# Patient Record
Sex: MALE | Race: BLACK OR AFRICAN AMERICAN | NOT HISPANIC OR LATINO | Employment: FULL TIME | URBAN - METROPOLITAN AREA
[De-identification: names, ages, dates, MRNs, and addresses within clinical notes are randomized per-mention and may not be internally consistent; named-entity substitution may affect disease eponyms.]

---

## 2024-04-23 ENCOUNTER — APPOINTMENT (OUTPATIENT)
Dept: GENERAL RADIOLOGY | Facility: HOSPITAL | Age: 34
End: 2024-04-23
Payer: OTHER MISCELLANEOUS

## 2024-04-23 ENCOUNTER — HOSPITAL ENCOUNTER (EMERGENCY)
Facility: HOSPITAL | Age: 34
Discharge: OTHER INSTITUTION | End: 2024-04-23
Attending: EMERGENCY MEDICINE
Payer: OTHER MISCELLANEOUS

## 2024-04-23 VITALS
OXYGEN SATURATION: 100 % | HEIGHT: 72 IN | SYSTOLIC BLOOD PRESSURE: 120 MMHG | HEART RATE: 69 BPM | RESPIRATION RATE: 18 BRPM | TEMPERATURE: 98.3 F | DIASTOLIC BLOOD PRESSURE: 81 MMHG

## 2024-04-23 DIAGNOSIS — S62.630B OPEN DISPLACED FRACTURE OF DISTAL PHALANX OF RIGHT INDEX FINGER, INITIAL ENCOUNTER: Primary | ICD-10-CM

## 2024-04-23 PROCEDURE — 99285 EMERGENCY DEPT VISIT HI MDM: CPT

## 2024-04-23 PROCEDURE — 73140 X-RAY EXAM OF FINGER(S): CPT

## 2024-04-23 NOTE — DISCHARGE INSTRUCTIONS
Do not eat or drink anything.  Go directly to [facility] emergency department to see hand surgery.  Keep your wound clean and covered until then.

## 2024-04-23 NOTE — ED PROVIDER NOTES
"Time: 8:27 AM EDT  Date of encounter:  4/23/2024  Independent Historian/Clinical History and Information was obtained by:   Patient    History is limited by: N/A    Chief Complaint: Right hand, index finger laceration      History of Present Illness:  Patient is a 34 y.o. year old male who presents to the emergency department for evaluation of laceration to the distal index finger of the right hand.  Patient states he was at work less than an hour ago when he cut his finger on a metal drain grate.  Patient is right-handed.  Patient reports tetanus vaccine within the last 2 years.  Patient denies decreased range of motion, fever, chills, and shortness of breath.    HPI    Patient Care Team  Primary Care Provider: Provider, No Known    Past Medical History:     No Known Allergies  No past medical history on file.  No past surgical history on file.  No family history on file.    Home Medications:  Prior to Admission medications    Not on File        Social History:          Review of Systems:  Review of Systems   Constitutional:  Negative for chills and fever.   HENT:  Negative for congestion and rhinorrhea.    Eyes:  Negative for visual disturbance.   Respiratory:  Negative for cough and shortness of breath.    Cardiovascular:  Negative for chest pain and palpitations.   Gastrointestinal:  Negative for abdominal pain.   Genitourinary:  Negative for dysuria.   Musculoskeletal:  Negative for neck stiffness.        Pain and laceration to the distal second digit of the right hand.   Skin:  Positive for wound.   Neurological:  Negative for syncope.   Psychiatric/Behavioral:  Negative for behavioral problems and confusion.         Physical Exam:  /97   Pulse 78   Temp 98.3 °F (36.8 °C) (Oral)   Resp 18   Ht 182.9 cm (72\")   SpO2 98%     Physical Exam  Constitutional:       Appearance: Normal appearance.   HENT:      Head: Normocephalic and atraumatic.      Nose: Nose normal.      Mouth/Throat:      Mouth: Mucous " membranes are moist.   Eyes:      Extraocular Movements: Extraocular movements intact.      Conjunctiva/sclera: Conjunctivae normal.   Cardiovascular:      Rate and Rhythm: Normal rate and regular rhythm.      Heart sounds: Normal heart sounds.   Pulmonary:      Effort: Pulmonary effort is normal.      Breath sounds: Normal breath sounds.   Musculoskeletal:         General: Normal range of motion.   Skin:     General: Skin is warm.      Coloration: Skin is not jaundiced.      Findings: Bruising and lesion present.      Comments: Laceration to the distal second digit of the right hand, with underlying ecchymosis.  ROM intact.   Neurological:      General: No focal deficit present.      Mental Status: He is alert and oriented to person, place, and time.   Psychiatric:         Mood and Affect: Mood normal.         Behavior: Behavior normal.                  Procedures:  Procedures      Medical Decision Making:      Comorbidities that affect care:    Smoking    External Notes reviewed:    None      The following orders were placed and all results were independently analyzed by me:  Orders Placed This Encounter   Procedures    XR Finger 2+ View Right    IP General Consult (Use specialty-specific consult if known)       Medications Given in the Emergency Department:  Medications - No data to display     ED Course:         Labs:    Lab Results (last 24 hours)       ** No results found for the last 24 hours. **             Imaging:    XR Finger 2+ View Right    Result Date: 4/23/2024  XR FINGER 2+ VW RIGHT-  Date of Exam: 4/23/2024 8:15 AM  Indication: injury  Comparison: None available.  Findings: Oblique fracture involving the proximal shaft of the second digit distal phalanx with minimal displacement. No radiodense foreign body. Fracture does not extend to the second DIP joint. Remaining osseous structures intact.      Impression: Oblique fracture involving the proximal shaft of the second digit distal phalanx.    Electronically Signed By-Deniz Mcrae MD On:4/23/2024 8:52 AM         Differential Diagnosis and Discussion:    Laceration: Laceration was evaluated for arterial injury, ligamentous damage, and other neurovascular injury.  Orthopedic Injuries: Differential diagnosis includes but is not limited to fractures, soft tissue injuries, dislocations, contusions, ligamentous injuries, tendon injuries, nerve injuries, compartment syndrome, bursitis, and vascular injuries.    All X-rays impressions were independently interpreted by me.    [facility]               Patient Care Considerations:    Antibiotics: I considered giving IV antibiotics to this patient for his open fracture, however in consultation with [facility] hand surgery they have stated that they will give the patient antibiotics once he arrives there on transfer.      Consultants/Shared Management Plan:    Transfer Provider: I have discussed the case with Anjana on behalf of Dr. Mejia at [facility] hand surgery who agrees to accept the patient as a transfer.    Social Determinants of Health:    Patient is independent, reliable, and has access to care.       Disposition and Care Coordination:    Transferred: Through independent evaluation of the patient's history, physical, and imperical data, the patient meets criteria to be transferred to another hospital for evaluation/admission.        Final diagnoses:   Open displaced fracture of distal phalanx of right index finger, initial encounter        ED Disposition       ED Disposition   Transfer to Another Facility     Condition   --    Comment   --               This medical record created using voice recognition software.           Beni Mcbride MD  04/23/24 [id number]